# Patient Record
Sex: FEMALE | Race: BLACK OR AFRICAN AMERICAN
[De-identification: names, ages, dates, MRNs, and addresses within clinical notes are randomized per-mention and may not be internally consistent; named-entity substitution may affect disease eponyms.]

---

## 2017-03-01 NOTE — EMERGENCY ROOM REPORT
History of Present Illness


General


Chief Complaint:  Pain


Source:  Patient





Present Illness


HPI


51-year-old female presents to emergency Department complaining of painful 

lower abdominal cramping with heavy vaginal bleeding since yesterday.  Patient 

states that she has not had her period for several months and she is currently 

starting menopause.  Patient states that her symptoms are not relieved by 

Motrin.  Patient denies dizziness, nausea, vomiting, chills or weakness. Denies 

abdominal tenderness. Patient denies history of blood dyscrasia she is not 

taking any blood thinning medications.  She denies vaginal discharge other than 

blood or history of STDs.  She denies frequency, urgency, hematuria, dysuria. 

Pt estimates using 2 super absorbency pads today and one yesterday. Denies CP, 

Palpitations, LOC, AMS, dizziness, Changes in Vision, Sensation, paresthesias, 

or a sudden severe headache.


Allergies:  


Coded Allergies:  


     PENICILLINS (Verified  Allergy, Mild, Rash, 8/16/14)





Patient History


Past Medical History:  see triage record


Past Surgical History:  none


Pertinent Family History:  none


Last Menstrual Period:  on period


Immunizations:  UTD


Reviewed Nursing Documentation:  PMH: Agreed, PSxH: Agreed





Nursing Documentation-PMH


Past Medical History:  No History, Except For


Hx Hypertension:  Yes


Hx Cerebrovascular Accident:  Yes - 2014 x2


Hx Seizures:  No





Review of Systems


All Other Systems:  negative except mentioned in HPI





Physical Exam





Vital Signs








  Date Time  Temp Pulse Resp B/P Pulse Ox O2 Delivery O2 Flow Rate FiO2


 


3/1/17 13:30 98.1 87 16 138/72 99   








Sp02 EP Interpretation:  reviewed, normal


General Appearance:  no apparent distress, alert, GCS 15, non-toxic


Head:  normocephalic, atraumatic


Eyes:  bilateral eye PERRL, bilateral eye normal inspection


ENT:  hearing grossly normal, normal pharynx, no angioedema, normal voice


Neck:  full range of motion, supple/symm/no masses


Respiratory:  lungs clear, normal breath sounds, speaking full sentences


Cardiovascular #1:  regular rate, rhythm, no edema


Gastrointestinal:  normal bowel sounds, non tender, soft, no guarding, no 

rebound, other - Negative Eden signs, Negative MacBurney's sign, Negative 

Rosvigns Sign, Negative Psoas, No Peritoneal signs.


Rectal:  deferred


Genitourinary:  normal inspection, no CVA tenderness, adnexa normal, other - no 

adenexal pain on palpation


Musculoskeletal:  back normal, gait/station normal, normal range of motion, non-

tender, no calf tenderness


Neurologic:  alert, oriented x3, responsive, motor strength/tone normal, 

sensory intact, speech normal


Psychiatric:  judgement/insight normal, memory normal, mood/affect normal, no 

suicidal/homicidal ideation


Skin:  normal color, no rash, warm/dry, well hydrated


Lymphatic:  no adenopathy





Medical Decision Making


PA Attestation


Dr. roldan is my supervising Physician whom patient management has been 

discussed with.


Diagnostic Impression:  


 Primary Impression:  


 Menometrorrhagia


 Additional Impression:  


 Menopausal bleeding


ER Course


51-year-old female presents to emergency Department complaining of painful 

lower abdominal cramping with heavy vaginal bleeding since yesterday.  Patient 

states that she has not had her period for several months and she is currently 

starting menopause.  Patient states that her symptoms are not relieved by 

Motrin.  Patient denies dizziness, nausea, vomiting, chills or weakness.  

Patient denies history of blood dyscrasia she is not taking any blood thinning 

medications.  She denies vaginal discharge other than blood or history of STDs.

  She denies frequency, urgency, hematuria, dysuria.





-Estimates using 2 super absorbency pads today and one yesterday. 





Ddx considered but are not limited to: Fibroid, , Malignancy,  DUB





Vital signs: are WNL, pt. is afebrile 





Pelvic Exam: Pt Declines 


H&PE are most consistent with: Painful Menstraul cramping. no symptoms to 

suggest anemia. 





ORDERS: 


- US not required at this time. 





ED INTERVENTIONS: Norco PO








-Pt. is stable for close outpatient follow up and eval by OBGYN.


 


DISCHARGE: At this time pt. is stable for d/c to home. Will provide printed 

patient care instructions, and any necessary prescriptions. Care plan and 

follow up instructions have been discussed with the patient prior to discharge.





Last Vital Signs








  Date Time  Temp Pulse Resp B/P Pulse Ox O2 Delivery O2 Flow Rate FiO2


 


3/1/17 13:30 98.1 87 16 138/72 99   








Disposition:  HOME, SELF-CARE


Condition:  Stable


Scripts


Ibuprofen* (MOTRIN*) 800 Mg Tablet


800 MG ORAL THREE TIMES A DAY, #30 TAB 0 Refills


   Prov: Ana Adan.MARY ANN         3/1/17 


Hydrocodone Bit/Acetaminophen 5-325* (NORCO 5-325 TABLET*) 1 Each Tablet


1 TAB ORAL Q8HR Y for For Pain, #5 TAB


   Prov: Ana Adan         3/1/17


Referrals:  


NON PHYSICIAN (PCP)


Patient Instructions:  Menopause, Menorrhagia, Easy-to-Read





Additional Instructions:  


Take medications as directed. 


Follow up with PCP in 3-5 days 


Return sooner to ED if new symptoms occur, or current symptoms become worse. 


Do not drink alcohol, drive, or operate heavy machinery while taking Norco as 

this may cause drowsiness. 











- Please note that this Emergency Department Report was dictated using [employer] technology software, occasionally this can lead to 

erroneous entry secondary to interpretation by the dictation equipment.











Ana Adan Mar 1, 2017 14:22

## 2018-01-13 NOTE — EMERGENCY ROOM REPORT
History of Present Illness


General


Chief Complaint:  Upper Respiratory Illness


Source:  Patient, Medical Record





Present Illness


HPI


52-year-old female presents to the emergency department complaining of dry 

nonproductive cough times one week.  Patient reports initial symptoms were 

runny nose, nasal congestion and increased phlegm.  Patient reports that cough 

continues to linger.  Patient denies history of asthma or COPD.  And denies 

fevers or chills. Denies high fevers, lethargy, neck stiffness, irritability, 

dehydration, N/V/D. Denies Cp, Palpitations, LOC, AMS, seizures, paresthesias, 

or changes in Hearing or vision, no Sudden severe HA.


Allergies:  


Coded Allergies:  


     PENICILLINS (Verified  Allergy, Mild, Rash, 8/16/14)





Patient History


Past Medical History:  see triage record


Past Surgical History:  none


Pertinent Family History:  none


Immunizations:  UTD - no flu vaccination this year


Reviewed Nursing Documentation:  PMH: Agreed, PSxH: Agreed





Nursing Documentation-PMH


Past Medical History:  No History, Except For


Hx Hypertension:  Yes


Hx Cerebrovascular Accident:  Yes - 2014 x2


Hx Seizures:  No





Review of Systems


All Other Systems:  negative except mentioned in HPI





Physical Exam





Vital Signs








  Date Time  Temp Pulse Resp B/P (MAP) Pulse Ox O2 Delivery O2 Flow Rate FiO2


 


1/13/18 12:55 98.8 79 18 168/95 97 Room Air  








Sp02 EP Interpretation:  reviewed, normal


General Appearance:  no apparent distress, alert, GCS 15, non-toxic


Head:  normocephalic, atraumatic


Eyes:  bilateral eye normal inspection, bilateral eye PERRL


ENT:  hearing grossly normal, normal voice, TMs + canals normal, uvula midline, 

moist mucus membranes, nasal congestion


Neck:  full range of motion


Respiratory:  chest non-tender, lungs clear, speaking full sentences, wheezing 

- scant upper lobe wheezes bilaterally.


Cardiovascular #1:  regular rate, rhythm


Rectal:  deferred


Genitourinary:  normal inspection


Musculoskeletal:  back normal, gait/station normal, normal range of motion, non-

tender


Neurologic:  alert, oriented x3, responsive, motor strength/tone normal, 

sensory intact, speech normal, grossly normal


Psychiatric:  judgement/insight normal


Skin:  normal color, no rash, warm/dry, well hydrated


Lymphatic:  no adenopathy





Medical Decision Making


PA Attestation


Dr. Dove  is my supervising Physician whom patient management has been 

discussed with.


Diagnostic Impression:  


 Primary Impression:  


 Bronchitis


ER Course


52-year-old female presents to the emergency department complaining of dry 

nonproductive cough times one week.  Patient reports initial symptoms were 

runny nose, nasal congestion and increased phlegm.  Patient reports that cough 

continues to linger.  Patient denies history of asthma or COPD.  And denies 

fevers or chills. Denies high fevers, lethargy, neck stiffness, irritability, 

dehydration, N/V/D. Denies Cp, Palpitations, LOC, AMS, seizures, paresthesias, 

or changes in Hearing or vision, no Sudden severe HA. 





Ddx considered but are not limited to URI, pneumonia, PE, strep pharyngitis, 

meningitis.





Vital signs: Pt.is afebrile VS are WNL


H&PE are most consistent with bronchitis





ORDERS: none required at this time, the diagnosis is clinical





ED INTERVENTIONS: None required at this time. 





DISCHARGE: At this time pt. is stable for d/c to home. Will provide printed 

patient care instructions, and any necessary prescriptions. Care plan and 

follow up instructions have been discussed with the patient prior to discharge.





Last Vital Signs








  Date Time  Temp Pulse Resp B/P (MAP) Pulse Ox O2 Delivery O2 Flow Rate FiO2


 


1/13/18 12:55 98.8 79 18 168/95 97 Room Air  








Disposition:  HOME, SELF-CARE


Condition:  Stable


Scripts


Guaifenesin (Guaifenesin) 1,200 Mg Tab.er.12h


1200 MG PO BID, #20 TAB


   Prov: Ana Adan P.A.         1/13/18 


Albuterol Sulfate* (ALBUTEROL SULFATE MDI*) 8.5 Gm Hfa.aer.ad


2 PUFF INH Q3H, #1 INH 0 Refills


   Prov: Ana Adan P.A.         1/13/18 


Benzonatate* (TESSALON PERLE*) 100 Mg Capsule


100 MG ORAL THREE TIMES A DAY, #30 PERLE


   Prov: Ana Adan P.A.         1/13/18 


Codeine/Promethazine Hcl* (PROMETHAZINE-CODEINE SYRUP*) 118 Ml Syrup


5 ML ORAL Q6H Y for For Cough, #118 ML 0 Refills


   Prov: Ana Adan P.A.         1/13/18


Patient Instructions:  Acute Bronchitis, Easy-to-Read





Additional Instructions:  


Take medications as directed. 


 ** Follow up with a Primary Care Provider in 3-5 days, even if your symptoms 

have resolved. ** 


--Please review list of primary care clinics, if you do not already have a 

primary care provider





Return sooner to ED if new symptoms occur, or current symptoms become worse. 


Do not drink alcohol, drive, or operate heavy machinery while taking Cough 

Syrup as this may cause drowsiness. 











- Please note that this Emergency Department Report was dictated using [employer] technology software, occasionally this can lead to 

erroneous entry secondary to interpretation by the dictation equipment.











Ana Adan Jan 13, 2018 13:22

## 2018-02-07 NOTE — EMERGENCY ROOM REPORT
History of Present Illness


General


Chief Complaint:  Earache


Source:  Patient





Present Illness


HPI


The patient was treated for influenza and upper respiratory infection.  Chest 

congestion and ear pain at this time has pressure in her ears.  Feels like 

fluid in her ears.  Also sinus congestion - clear.  Minimal sore throat - 

tolerating eating without difficulty.   Cough has improved.





No NVD, dysuria, rashes, headache.





Feels wiped out from several weeks of illness.


Allergies:  


Coded Allergies:  


     PENICILLINS (Verified  Allergy, Mild, Rash, 8/16/14)





Patient History


Past Medical History:  see triage record


Social History:  Denies: smoking


Reviewed Nursing Documentation:  PMH: Agreed, PSxH: Agreed





Nursing Documentation-PMH


Hx Hypertension:  Yes


Hx Cerebrovascular Accident:  Yes - 2014 x2


Hx Seizures:  No





Review of Systems


All Other Systems:  negative except mentioned in HPI





Physical Exam





Vital Signs








  Date Time  Temp Pulse Resp B/P (MAP) Pulse Ox O2 Delivery O2 Flow Rate FiO2


 


2/7/18 09:49 97.5 86 20 174/97 99 Room Air  








Sp02 EP Interpretation:  reviewed, normal


General Appearance:  well appearing, no apparent distress, GCS 15


Head:  normocephalic, atraumatic


Eyes:  bilateral eye normal inspection, bilateral eye PERRL


ENT:  other - bilat TMs with buldge and fluid, min erythema - clear d/c nose


Neck:  full range of motion, supple


Respiratory:  chest non-tender, lungs clear, normal breath sounds, no 

respiratory distress, speaking full sentences


Cardiovascular #1:  normal peripheral pulses, no edema


Cardiovascular #2:  2+ radial (R)


Gastrointestinal:  normal inspection


Musculoskeletal:  digits/nails normal, gait/station normal


Neurologic:  alert, normal gait, grossly normal


Psychiatric:  mood/affect normal


Skin:  no rash





Medical Decision Making


Diagnostic Impression:  


 Primary Impression:  


 Bilateral otitis media


 Qualified Codes:  H66.003 - Acute suppurative otitis media without spontaneous 

rupture of ear drum, bilateral


ER Course


Patient post influenza with ear pain.  Ddx: otitis media, serous OM.  Exam 

against OE.  Antibiotics indicated.





Patient treated here - improved.





Patient stable for outpatient observation and treatment.





Last Vital Signs








  Date Time  Temp Pulse Resp B/P (MAP) Pulse Ox O2 Delivery O2 Flow Rate FiO2


 


2/7/18 10:47 97.5 89 20 165/90 99 Room Air  








Status:  improved


Disposition:  HOME, SELF-CARE


Condition:  Improved


Scripts


Guaifenesin/Codeine Phos* (ROBITUSSIN AC*) 118 Ml Liquid


5 ML ORAL Q6H Y for For Cough, #90 ML 0 Refills


   Prov: Jason Miles M.D.         2/7/18 


Ciprofloxacin* (CIPRO*) 500 Mg Tablet


500 MG PO BID, #14 TAB


   Prov: Jason Miles M.D.         2/7/18 


Chlorpheniramine Maleate (CHLOR-TRIMETON) 4 Mg Tablet


4 MG PO Q6HR Y for congestion, #14 TAB


   Prov: Jason Miles M.D.         2/7/18











Jason Miles M.D. Feb 7, 2018 10:28

## 2018-08-18 ENCOUNTER — HOSPITAL ENCOUNTER (EMERGENCY)
Dept: HOSPITAL 72 - EMR | Age: 53
Discharge: HOME | End: 2018-08-18
Payer: MEDICAID

## 2018-08-18 VITALS — WEIGHT: 160 LBS | BODY MASS INDEX: 27.31 KG/M2 | HEIGHT: 64 IN

## 2018-08-18 VITALS — SYSTOLIC BLOOD PRESSURE: 128 MMHG | DIASTOLIC BLOOD PRESSURE: 76 MMHG

## 2018-08-18 VITALS — SYSTOLIC BLOOD PRESSURE: 111 MMHG | DIASTOLIC BLOOD PRESSURE: 70 MMHG

## 2018-08-18 DIAGNOSIS — Z88.0: ICD-10-CM

## 2018-08-18 DIAGNOSIS — I10: ICD-10-CM

## 2018-08-18 DIAGNOSIS — H15.103: Primary | ICD-10-CM

## 2018-08-18 DIAGNOSIS — Z86.73: ICD-10-CM

## 2018-08-18 PROCEDURE — 99283 EMERGENCY DEPT VISIT LOW MDM: CPT

## 2018-08-18 NOTE — EMERGENCY ROOM REPORT
History of Present Illness


General


Chief Complaint:  Eye Problems


Source:  Patient, Medical Record





Present Illness


HPI


53 YO Female presents to the ED c/o bilateral blood shot eyes x 2 weeks. pt. 

became worried this am when she awoke reports that her eyes were completely red 

and more so than before. Denies Pain, Discharge,  changes or  Loss of vision, 

Floaters, Flashing lights, Diplopia/blurry vision, or Increased tearing. Pt. 

denies FB sensation. Pt. reports hx of lupus. denies recent URI. Denies 

allergies. Denies contact lens use.


Allergies:  


Coded Allergies:  


     PENICILLINS (Verified  Allergy, Mild, Rash, 8/16/14)





Patient History


Past Medical History:  see triage record


Past Surgical History:  none


Pertinent Family History:  none


Pregnant Now:  No


Immunizations:  UTD


Reviewed Nursing Documentation:  PMH: Agreed; PSxH: Agreed





Nursing Documentation-PMH


Past Medical History:  No History, Except For


Hx Hypertension:  Yes


Hx Cerebrovascular Accident:  Yes - 2014 x2


Hx Seizures:  No





Review of Systems


All Other Systems:  negative except mentioned in HPI





Physical Exam





Vital Signs








  Date Time  Temp Pulse Resp B/P (MAP) Pulse Ox O2 Delivery O2 Flow Rate FiO2


 


8/18/18 17:50 97.9 75 16 128/76 97 Room Air  





 97.9       








Sp02 EP Interpretation:  reviewed, normal


General Appearance:  well appearing, no apparent distress, alert, GCS 15, non-

toxic


Head:  normocephalic, atraumatic


Eyes:  bilateral eye normal inspection, bilateral eye PERRL, bilateral eye EOMI

, bilateral eye Scleral Injection, bilateral eye other - scleral injection 

peripherally that is painless, no other ocular symptoms. no evidence of fb on 

lid flip, no crusting, d/c, or abnormal pupillary response. no photophobia. no 

mid-fixed dilated pupil.


ENT:  hearing grossly normal, normal voice


Neck:  full range of motion


Respiratory:  lungs clear, normal breath sounds, speaking full sentences


Cardiovascular #1:  regular rate, rhythm


Musculoskeletal:  back normal, gait/station normal, normal range of motion, non-

tender


Neurologic:  alert, oriented x3, responsive, motor strength/tone normal, 

sensory intact, normal gait, speech normal, grossly normal


Psychiatric:  judgement/insight normal


Skin:  normal color, no rash, warm/dry, well hydrated


Lymphatic:  no adenopathy





Medical Decision Making


PA Attestation


Dr. roldan is my supervising Physician whom patient management has been 

discussed with.


Diagnostic Impression:  


 Primary Impression:  


 Episcleritis of both eyes


ER Course


53 YO Female presents to the ED c/o bilateral blood shot eyes x 2 weeks. pt. 

became worried this am when she awoke reports that her eyes were completely red 

and more so than before. Denies Pain, Discharge,  changes or  Loss of vision, 

Floaters, Flashing lights, Diplopia/blurry vision, or Increased tearing. Pt. 

denies FB sensation. Pt. reports hx of lupus. denies recent URI. Denies 

allergies. Denies contact lens use. 





- Pt [reports/denies] Contact lens use.


Ddx considered but are not limited to: corneal abrasion, acute glaucoma, globe 

rupture, FB, Corneal Ulcer, conjunctivitis. Iridis 





Vital signs: are WNL, pt. is afebrile 





H&PE: Are most consistent with: episcleritis, scleral injection peripherally 

that is painless, no other ocular symptoms. no evidence of fb on lid flip, no 

crusting, d/c, or abnormal pupillary response. no photophobia. no mid-fixed 

dilated pupil. 


- Pt. NAD, non-toxic in appearance.





ORDERS: 


-none required at this time. 





ED INTERVENTIONS: none at this time. 





DISCHARGE: At this time pt. is stable for d/c to home. Will provide printed 

patient care instructions, and any necessary prescriptions. Care plan and 

follow up instructions have been discussed with the patient prior to discharge. 

.





Last Vital Signs








  Date Time  Temp Pulse Resp B/P (MAP) Pulse Ox O2 Delivery O2 Flow Rate FiO2


 


8/18/18 18:00 97.9 75 16 128/76 97 Room Air  





 97.9       








Disposition:  HOME, SELF-CARE


Condition:  Stable


Scripts


Diclofenac Sodium (Diclofenac Sodium) 5 Ml Drops


1 DROP OP BID, #5 ML


   Prov: Ana Adan         8/18/18 


Dextran 70/Hypromellose (ARTIFICIAL TEARS EYE DROPS*) 15 Ml Drops


1 DROP BOTH EYES QID, #15 ML 0 Refills


   Prov: Ana Adan         8/18/18


Referrals:  


NON PHYSICIAN (PCP)


Patient Instructions:  Scleritis and Episcleritis





Additional Instructions:  


Take medications as directed. 





 ** Follow up with a Ophthalmologist in 3 days, even if your symptoms have 

resolved. ** 





--Please review list of primary care clinics, if you do not already have a 

primary care provider





Return sooner to ED if new symptoms occur, or current symptoms become worse. 








- Please note that this Emergency Department Report was dictated using [employer] technology software, occasionally this can lead to 

erroneous entry secondary to interpretation by the dictation equipment.











Ana Adan Aug 18, 2018 18:26

## 2018-08-22 ENCOUNTER — HOSPITAL ENCOUNTER (EMERGENCY)
Dept: HOSPITAL 72 - EMR | Age: 53
Discharge: HOME | End: 2018-08-22
Payer: MEDICAID

## 2018-08-22 VITALS — WEIGHT: 150 LBS | HEIGHT: 59 IN | BODY MASS INDEX: 30.24 KG/M2

## 2018-08-22 VITALS — DIASTOLIC BLOOD PRESSURE: 80 MMHG | SYSTOLIC BLOOD PRESSURE: 145 MMHG

## 2018-08-22 VITALS — SYSTOLIC BLOOD PRESSURE: 145 MMHG | DIASTOLIC BLOOD PRESSURE: 75 MMHG

## 2018-08-22 DIAGNOSIS — N39.0: ICD-10-CM

## 2018-08-22 DIAGNOSIS — Z88.0: ICD-10-CM

## 2018-08-22 DIAGNOSIS — Z86.73: ICD-10-CM

## 2018-08-22 DIAGNOSIS — I10: ICD-10-CM

## 2018-08-22 DIAGNOSIS — R55: Primary | ICD-10-CM

## 2018-08-22 DIAGNOSIS — M32.9: ICD-10-CM

## 2018-08-22 DIAGNOSIS — H10.10: ICD-10-CM

## 2018-08-22 LAB
ADD MANUAL DIFF: NO
ALBUMIN SERPL-MCNC: 4.2 G/DL (ref 3.4–5)
ALBUMIN/GLOB SERPL: 1 {RATIO} (ref 1–2.7)
ALP SERPL-CCNC: 59 U/L (ref 46–116)
ALT SERPL-CCNC: 15 U/L (ref 12–78)
ANION GAP SERPL CALC-SCNC: 9 MMOL/L (ref 5–15)
APPEARANCE UR: (no result)
APTT PPP: (no result) S
AST SERPL-CCNC: 15 U/L (ref 15–37)
BASOPHILS NFR BLD AUTO: 1 % (ref 0–2)
BILIRUB SERPL-MCNC: 0.3 MG/DL (ref 0.2–1)
BUN SERPL-MCNC: 14 MG/DL (ref 7–18)
CALCIUM SERPL-MCNC: 9.9 MG/DL (ref 8.5–10.1)
CHLORIDE SERPL-SCNC: 105 MMOL/L (ref 98–107)
CK MB SERPL-MCNC: 0.9 NG/ML (ref 0–3.6)
CK SERPL-CCNC: 79 U/L (ref 26–308)
CO2 SERPL-SCNC: 28 MMOL/L (ref 21–32)
CREAT SERPL-MCNC: 0.9 MG/DL (ref 0.55–1.3)
EOSINOPHIL NFR BLD AUTO: 1.1 % (ref 0–3)
ERYTHROCYTE [DISTWIDTH] IN BLOOD BY AUTOMATED COUNT: 13.5 % (ref 11.6–14.8)
GLOBULIN SER-MCNC: 4.2 G/DL
GLUCOSE UR STRIP-MCNC: NEGATIVE MG/DL
HCT VFR BLD CALC: 39.4 % (ref 37–47)
HGB BLD-MCNC: 13.1 G/DL (ref 12–16)
KETONES UR QL STRIP: NEGATIVE
LEUKOCYTE ESTERASE UR QL STRIP: (no result)
LYMPHOCYTES NFR BLD AUTO: 41.1 % (ref 20–45)
MCV RBC AUTO: 81 FL (ref 80–99)
MONOCYTES NFR BLD AUTO: 6 % (ref 1–10)
NEUTROPHILS NFR BLD AUTO: 50.8 % (ref 45–75)
NITRITE UR QL STRIP: NEGATIVE
PH UR STRIP: 6 [PH] (ref 4.5–8)
PLATELET # BLD: 256 K/UL (ref 150–450)
POTASSIUM SERPL-SCNC: 3.3 MMOL/L (ref 3.5–5.1)
PROT UR QL STRIP: (no result)
RBC # BLD AUTO: 4.85 M/UL (ref 4.2–5.4)
SODIUM SERPL-SCNC: 142 MMOL/L (ref 136–145)
SP GR UR STRIP: 1.02 (ref 1–1.03)
UROBILINOGEN UR-MCNC: NORMAL MG/DL (ref 0–1)
WBC # BLD AUTO: 8.4 K/UL (ref 4.8–10.8)

## 2018-08-22 PROCEDURE — 99284 EMERGENCY DEPT VISIT MOD MDM: CPT

## 2018-08-22 PROCEDURE — 85025 COMPLETE CBC W/AUTO DIFF WBC: CPT

## 2018-08-22 PROCEDURE — 80053 COMPREHEN METABOLIC PANEL: CPT

## 2018-08-22 PROCEDURE — 71045 X-RAY EXAM CHEST 1 VIEW: CPT

## 2018-08-22 PROCEDURE — 82550 ASSAY OF CK (CPK): CPT

## 2018-08-22 PROCEDURE — 82553 CREATINE MB FRACTION: CPT

## 2018-08-22 PROCEDURE — 93005 ELECTROCARDIOGRAM TRACING: CPT

## 2018-08-22 PROCEDURE — 36415 COLL VENOUS BLD VENIPUNCTURE: CPT

## 2018-08-22 PROCEDURE — 84484 ASSAY OF TROPONIN QUANT: CPT

## 2018-08-22 PROCEDURE — 81003 URINALYSIS AUTO W/O SCOPE: CPT

## 2018-08-22 NOTE — EMERGENCY ROOM REPORT
History of Present Illness


General


Chief Complaint:  Syncope


Source:  Patient, Medical Record





Present Illness


HPI


This patient states that yesterday in an ophthalmology office she had an 

episode of fainting.  She states that she was there to get evaluated for red 

conjunctiva.  She states that she became lightheaded and then had an episode of 

fainting.  She states that she was taken to [facility] but left 

AGAINST MEDICAL ADVICE.  She states she was told by the emergency medicine 

physician there that she had an elevated potassium.  She states that she is 

feeling better today.  She states she relates the elevated potassium to her 

amlodipine.  She has decided to go off her amlodipine.  She is going to take 

her losartan although she did not take it today.  She denies chest pain or 

shortness of breath.  She denies abdominal pain.  She denies fever or chills.  

She states that she did have multiple episodes of vomiting yesterday and also 

had diarrhea. She has had no vomiting today.  She denies abdominal pain.  She 

has no other complaints.


Allergies:  


Coded Allergies:  


     PENICILLINS (Verified  Allergy, Mild, Rash, 8/16/14)





Patient History


Past Medical History:  HTN, other - SLE


Social History:  Denies: smoking, alcohol use, drug use


Last Menstrual Period:  long time ago


Reviewed Nursing Documentation:  PMH: Agreed; PSxH: Agreed





Nursing Documentation-PMH


Past Medical History:  No History, Except For


Hx Hypertension:  Yes


Hx Cerebrovascular Accident:  Yes - 2014 x2


Hx Seizures:  No





Review of Systems


All Other Systems:  negative except mentioned in HPI





Physical Exam





Vital Signs








  Date Time  Temp Pulse Resp B/P (MAP) Pulse Ox O2 Delivery O2 Flow Rate FiO2


 


8/22/18 18:22 100.1 88 18 136/75 97 Room Air  





 100.0       








Sp02 EP Interpretation:  reviewed, normal


General Appearance:  no apparent distress, alert, GCS 15, non-toxic


Head:  normocephalic, atraumatic


Eyes:  bilateral eye normal inspection, bilateral eye PERRL


ENT:  hearing grossly normal, normal pharynx, no angioedema, normal voice


Neck:  full range of motion, supple/symm/no masses


Respiratory:  chest non-tender, lungs clear, normal breath sounds, no 

respiratory distress, no retraction, no accessory muscle use, speaking full 

sentences


Cardiovascular #1:  regular rate, rhythm, no edema


Gastrointestinal:  normal bowel sounds, non tender, soft, non-distended, no 

guarding, no rebound


Rectal:  deferred


Musculoskeletal:  back normal, gait/station normal, normal range of motion, non-

tender


Neurologic:  alert, oriented x3, responsive, motor strength/tone normal, 

sensory intact, speech normal


Psychiatric:  judgement/insight normal, memory normal, mood/affect normal, no 

suicidal/homicidal ideation


Skin:  normal color, no rash, warm/dry, well hydrated





Medical Decision Making


Diagnostic Impression:  


 Primary Impression:  


 Syncope


 Additional Impressions:  


 Allergic conjunctivitis


 UTI (urinary tract infection)


ER Course


I suspect the syncope that the patient is presenting with is a nonemergent in 

etiology.  Regarding the history, the patient has no history of structural 

heart disease or coronary artery disease, no family history of sudden death, 

has no shortness of breath, and the syncope is not exertional.  On physical exam

, the patient is not hypotensive, has no findings of CHF, and no significant 

cardiac murmur suggestive of valvular heart disease or cardiac outflow 

obstruction.  The patient reports no history of seizure or head trauma.  EKG 

showed no evidence of concerning findings of QT prolongation, Brugada syndrome 

or significant ST changes suggestive of acute ischemia, dysrhythmias or 

significant conduction abnormalities.  On laboratory evaluation, blood sugar 

was normal and the patient is not anemic.  The patient was counseled that, 

though unlikely, the possibility of an emergent cause of syncope may be present 

and that the patient should return immediately if symptoms persist or worsen.  

I believe the patient is stable for discharge to followup with her PMD for 

further workup.





Laboratory Tests








Test


  8/22/18


18:40 8/22/18


18:55


 


Urine Color Pale yellow   


 


Urine Appearance


  Slightly


cloudy 


 


 


Urine pH 6 (4.5-8.0)   


 


Urine Specific Gravity


  1.020


(1.005-1.035) 


 


 


Urine Protein


  2+ (NEGATIVE)


H 


 


 


Urine Glucose (UA)


  Negative


(NEGATIVE) 


 


 


Urine Ketones


  Negative


(NEGATIVE) 


 


 


Urine Blood


  2+ (NEGATIVE)


H 


 


 


Urine Nitrite


  Negative


(NEGATIVE) 


 


 


Urine Bilirubin


  Negative


(NEGATIVE) 


 


 


Urine Urobilinogen


  Normal MG/DL


(0.0-1.0) 


 


 


Urine Leukocyte Esterase


  3+ (NEGATIVE)


H 


 


 


Urine RBC


  2-4 /HPF (0 -


2)  H 


 


 


Urine WBC


  5-10 /HPF (0 -


2)  H 


 


 


Urine Squamous Epithelial


Cells Moderate /LPF


(NONE/OCC)  H 


 


 


Urine Bacteria


  Few /HPF


(NONE) 


 


 


White Blood Count


  


  8.4 K/UL


(4.8-10.8)


 


Red Blood Count


  


  4.85 M/UL


(4.20-5.40)


 


Hemoglobin


  


  13.1 G/DL


(12.0-16.0)


 


Hematocrit


  


  39.4 %


(37.0-47.0)


 


Mean Corpuscular Volume  81 FL (80-99)  


 


Mean Corpuscular Hemoglobin


  


  26.9 PG


(27.0-31.0)  L


 


Mean Corpuscular Hemoglobin


Concent 


  33.1 G/DL


(32.0-36.0)


 


Red Cell Distribution Width


  


  13.5 %


(11.6-14.8)


 


Platelet Count


  


  256 K/UL


(150-450)


 


Mean Platelet Volume


  


  6.3 FL


(6.5-10.1)  L


 


Neutrophils (%) (Auto)


  


  50.8 %


(45.0-75.0)


 


Lymphocytes (%) (Auto)


  


  41.1 %


(20.0-45.0)


 


Monocytes (%) (Auto)


  


  6.0 %


(1.0-10.0)


 


Eosinophils (%) (Auto)


  


  1.1 %


(0.0-3.0)


 


Basophils (%) (Auto)


  


  1.0 %


(0.0-2.0)


 


Sodium Level


  


  142 MMOL/L


(136-145)


 


Potassium Level


  


  3.3 MMOL/L


(3.5-5.1)  L


 


Chloride Level


  


  105 MMOL/L


()


 


Carbon Dioxide Level


  


  28 MMOL/L


(21-32)


 


Anion Gap


  


  9 mmol/L


(5-15)


 


Blood Urea Nitrogen


  


  14 mg/dL


(7-18)


 


Creatinine


  


  0.9 MG/DL


(0.55-1.30)


 


Estimate Glomerular


Filtration Rate 


  > 60 mL/min


(>60)


 


Glucose Level


  


  90 MG/DL


()


 


Calcium Level


  


  9.9 MG/DL


(8.5-10.1)


 


Total Bilirubin


  


  0.3 MG/DL


(0.2-1.0)


 


Aspartate Amino Transferase


(AST) 


  15 U/L (15-37)


 


 


Alanine Aminotransferase (ALT)


  


  15 U/L (12-78)


 


 


Alkaline Phosphatase


  


  59 U/L


()


 


Total Creatine Kinase


  


  79 U/L


()


 


Creatine Kinase MB


  


  0.9 NG/ML


(0.0-3.6)


 


Creatine Kinase MB Relative


Index 


  1.1  


 


 


Troponin I


  


  0.000 ng/mL


(0.000-0.056)


 


Total Protein


  


  8.4 G/DL


(6.4-8.2)  H


 


Albumin


  


  4.2 G/DL


(3.4-5.0)


 


Globulin  4.2 g/dL  


 


Albumin/Globulin Ratio  1.0 (1.0-2.7)  








EKG Diagnostic Results


Rate:  normal


Rhythm:  NSR


ST Segments:  no acute changes





Rhythm Strip Diag. Results


EP Interpretation:  yes


Rate:  80's


Rhythm:  NSR, no PVC's, no ectopy





Chest X-Ray Diagnostic Results


Chest X-Ray Diagnostic Results :  


   Chest X-Ray Ordered:  Yes


   # of Views/Limited/Complete:  1 View


   Indication:  Other


   Interpretation:  no consolidation, no effusion, no pneumothorax, no acute 

cardiopulmonary disease


   Impression:  No acute disease


   Electronically Signed by:  Canelo





Last Vital Signs








  Date Time  Temp Pulse Resp B/P (MAP) Pulse Ox O2 Delivery O2 Flow Rate FiO2


 


8/22/18 19:00  71 18 145/75 97 Room Air  


 


8/22/18 18:22 100.1       





 100.0       








Status:  improved


Disposition:  HOME, SELF-CARE


Condition:  Stable


Scripts


Nitrofurantoin Monohyd/M-Cryst* (MACROBID 100 MG*) 100 Mg Capsule


100 MG ORAL EVERY 12 HOURS for 7 Days, #14 CAP


   Prov: Melany Hardy DO         8/22/18


Referrals:  


[facility],REFERRING (PCP)











Melany Hardy DO Aug 22, 2018 20:16

## 2018-08-23 NOTE — DIAGNOSTIC IMAGING REPORT
Indication: Syncope

 

Technique: XRAY Chest 1v

 

Comparison: 11/23/2014

 

Findings: Heart size and mediastinal contours are stable compared to the prior exam.

There is no definite focal airspace consolidation, pleural effusion or pneumothorax.

No acute osseous abnormality identified.

 

Impression: No radiographic evidence of acute cardiopulmonary disease.

## 2018-08-24 NOTE — CARDIOLOGY REPORT
--------------- APPROVED REPORT --------------





EKG Measurement

Heart [id number]

CA [id number]

[id number]

[id number]

[id number]





Normal sinus rhythm

Cannot rule out Anterior infarct, age undetermined

Abnormal ECG

## 2018-09-20 ENCOUNTER — HOSPITAL ENCOUNTER (EMERGENCY)
Dept: HOSPITAL 72 - EMR | Age: 53
Discharge: HOME | End: 2018-09-20
Payer: MEDICAID

## 2018-09-20 VITALS — HEIGHT: 59 IN | BODY MASS INDEX: 30.24 KG/M2 | WEIGHT: 150 LBS

## 2018-09-20 VITALS — SYSTOLIC BLOOD PRESSURE: 161 MMHG | DIASTOLIC BLOOD PRESSURE: 89 MMHG

## 2018-09-20 VITALS — DIASTOLIC BLOOD PRESSURE: 89 MMHG | SYSTOLIC BLOOD PRESSURE: 161 MMHG

## 2018-09-20 DIAGNOSIS — J34.89: ICD-10-CM

## 2018-09-20 DIAGNOSIS — M32.9: ICD-10-CM

## 2018-09-20 DIAGNOSIS — Z88.0: ICD-10-CM

## 2018-09-20 DIAGNOSIS — R09.81: ICD-10-CM

## 2018-09-20 DIAGNOSIS — I10: ICD-10-CM

## 2018-09-20 DIAGNOSIS — Z86.73: ICD-10-CM

## 2018-09-20 DIAGNOSIS — J40: Primary | ICD-10-CM

## 2018-09-20 DIAGNOSIS — F17.200: ICD-10-CM

## 2018-09-20 PROCEDURE — 99283 EMERGENCY DEPT VISIT LOW MDM: CPT

## 2018-09-20 NOTE — EMERGENCY ROOM REPORT
History of Present Illness


General


Chief Complaint:  Upper Respiratory Illness


Source:  Patient





Present Illness


HPI


52-year-old female presents to the emergency department complaining of 

persistent cough 10 out of 10 in severity with bilateral ear pain, with nasal 

congestion and rhinorrhea.  Patient also reports new onset mucus that she is 

coughing up every now and then.  Patient reports chills she denies fevers.  

Patient states that her symptoms have been ongoing times almost 2 weeks.  

Patient reports history of weakness or consistent due to having lupus.  Patient 

denies sore throat she denies neck pain or stiffness or photophobia.  Denies CP

, Palpitations, LOC, AMS, dizziness, Changes in Vision, Sensation, paresthesias

, or a sudden severe headache.


Allergies:  


Coded Allergies:  


     PENICILLINS (Verified  Allergy, Mild, Rash, 8/16/14)





Patient History


Past Medical History:  see triage record


Pertinent Family History:  none


Pregnant Now:  No


Immunizations:  UTD


Reviewed Nursing Documentation:  PMH: Agreed; PSxH: Agreed





Nursing Documentation-PMH


Past Medical History:  No History, Except For


Hx Hypertension:  Yes


Hx Cerebrovascular Accident:  Yes - 2014 x2


Hx Seizures:  No





Review of Systems


All Other Systems:  negative except mentioned in HPI





Physical Exam





Vital Signs








  Date Time  Temp Pulse Resp B/P (MAP) Pulse Ox O2 Delivery O2 Flow Rate FiO2


 


9/20/18 18:01 97.6 84 14 161/89 99 Room Air  





 97.5       








Sp02 EP Interpretation:  reviewed, normal


General Appearance:  no apparent distress, alert, GCS 15, non-toxic


Head:  normocephalic, atraumatic


Eyes:  bilateral eye normal inspection, bilateral eye PERRL


ENT:  hearing grossly normal, normal voice, TMs + canals normal, uvula midline, 

moist mucus membranes, nasal congestion


Neck:  full range of motion, no meningismus, no bony tend


Respiratory:  chest non-tender, lungs clear, normal breath sounds, speaking 

full sentences


Cardiovascular #1:  regular rate, rhythm, no edema


Musculoskeletal:  back normal, gait/station normal, normal range of motion, non-

tender


Neurologic:  alert, oriented x3, responsive, motor strength/tone normal, 

sensory intact, speech normal, grossly normal


Psychiatric:  judgement/insight normal


Skin:  normal color, no rash, warm/dry, well hydrated


Lymphatic:  no adenopathy





Medical Decision Making


PA Attestation


 Dr. Kong is my supervising Physician whom patient management has been 

discussed with.


Diagnostic Impression:  


 Primary Impression:  


 Bronchitis


 Additional Impressions:  


 Post-nasal drainage


 Nasal congestion with rhinorrhea


ER Course


52-year-old female presents to the emergency department complaining of 

persistent cough 10 out of 10 in severity with bilateral ear pain, with nasal 

congestion and rhinorrhea.  Patient also reports new onset mucus that she is 

coughing up every now and then.  Patient reports chills she denies fevers.  

Patient states that her symptoms have been ongoing times almost 2 weeks.  

Patient reports history of weakness or consistent due to having lupus.  Patient 

denies sore throat she denies neck pain or stiffness or photophobia.  Denies CP

, Palpitations, LOC, AMS, dizziness, Changes in Vision, Sensation, paresthesias

, or a sudden severe headache. 





Ddx considered but are not limited to URI, pneumonia, PE, strep pharyngitis, 

meningitis, bronchitis just to name a few





Vital signs: Pt.is afebrile VS are WNL


H&PE are most consistent with bronchitis- patient is nontoxic in appearance I 

do not suspect bacterial pneumonia at this time





ORDERS: none required at this time, the diagnosis is clinical





ED INTERVENTIONS: None required at this time. 





DISCHARGE: At this time pt. is stable for d/c to home. Will provide printed 

patient care instructions, and any necessary prescriptions. Care plan and 

follow up instructions have been discussed with the patient prior to discharge.





Last Vital Signs








  Date Time  Temp Pulse Resp B/P (MAP) Pulse Ox O2 Delivery O2 Flow Rate FiO2


 


9/20/18 18:21 97.5  14 161/89 99 Room Air  





 97.5       


 


9/20/18 18:21  84      








Disposition:  HOME, SELF-CARE


Condition:  Stable


Scripts


Diphenhydramine Hcl (BENADRYL ALLERGY) 25 Mg Tablet


25 MG PO Q6HR, #20 TAB


   Prov: Ana Adan         9/20/18 


Pseudoephedrine Hcl* (NEXAFED*) 30 Mg Tablet


30 MG ORAL Q6H PRN for congestion, #15 TAB


   Prov: Ana Adan         9/20/18 


Albuterol Sulfate* (ALBUTEROL SULFATE MDI*) 8.5 Gm Hfa.aer.ad


2 PUFF INH Q6H, #1 INH 0 Refills


   Prov: Ana Adan         9/20/18 


Guaifenesin (Guaifenesin) 1,200 Mg Tab.er.12h


1200 MG PO BID, #20 TAB


   Prov: Ana Adan         9/20/18 


Codeine/Promethazine Hcl* (PROMETHAZINE-CODEINE SYRUP*) 118 Ml Syrup


5 ML ORAL Q6H PRN for For Cough, #120 ML 0 Refills


   Prov: Ana Adan         9/20/18


Patient Instructions:  Acute Bronchitis, Easy-to-Read





Additional Instructions:  


Take medications as directed. 


 ** Follow up with a Primary Care Provider in 3-5 days, even if your symptoms 

have resolved. ** 


--Please review list of primary care clinics, if you do not already have a 

primary care provider





Return sooner to ED if new symptoms occur, or current symptoms become worse. 


Do not drink alcohol, drive, or operate heavy machinery while taking Cough 

Syrup as this may cause drowsiness. 











- Please note that this Emergency Department Report was dictated using [employer] technology software, occasionally this can lead to 

erroneous entry secondary to interpretation by the dictation equipment.











Ana Adan Sep 20, 2018 18:44

## 2019-03-13 ENCOUNTER — HOSPITAL ENCOUNTER (EMERGENCY)
Dept: HOSPITAL 72 - EMR | Age: 54
Discharge: HOME | End: 2019-03-13
Payer: MEDICAID

## 2019-03-13 VITALS — SYSTOLIC BLOOD PRESSURE: 176 MMHG | DIASTOLIC BLOOD PRESSURE: 92 MMHG

## 2019-03-13 VITALS — SYSTOLIC BLOOD PRESSURE: 141 MMHG | DIASTOLIC BLOOD PRESSURE: 81 MMHG

## 2019-03-13 VITALS — WEIGHT: 154 LBS | HEIGHT: 59 IN | BODY MASS INDEX: 31.04 KG/M2

## 2019-03-13 VITALS — DIASTOLIC BLOOD PRESSURE: 81 MMHG | SYSTOLIC BLOOD PRESSURE: 141 MMHG

## 2019-03-13 DIAGNOSIS — I10: ICD-10-CM

## 2019-03-13 DIAGNOSIS — Z86.73: ICD-10-CM

## 2019-03-13 DIAGNOSIS — Z88.0: ICD-10-CM

## 2019-03-13 DIAGNOSIS — M54.5: Primary | ICD-10-CM

## 2019-03-13 LAB
ADD MANUAL DIFF: NO
ALBUMIN SERPL-MCNC: 4 G/DL (ref 3.4–5)
ALBUMIN/GLOB SERPL: 1 {RATIO} (ref 1–2.7)
ALP SERPL-CCNC: 65 U/L (ref 46–116)
ALT SERPL-CCNC: 16 U/L (ref 12–78)
ANION GAP SERPL CALC-SCNC: 9 MMOL/L (ref 5–15)
APPEARANCE UR: (no result)
APTT PPP: YELLOW S
AST SERPL-CCNC: 11 U/L (ref 15–37)
BASOPHILS NFR BLD AUTO: 0.8 % (ref 0–2)
BILIRUB SERPL-MCNC: 0.3 MG/DL (ref 0.2–1)
BUN SERPL-MCNC: 14 MG/DL (ref 7–18)
CALCIUM SERPL-MCNC: 9.6 MG/DL (ref 8.5–10.1)
CHLORIDE SERPL-SCNC: 104 MMOL/L (ref 98–107)
CO2 SERPL-SCNC: 28 MMOL/L (ref 21–32)
CREAT SERPL-MCNC: 1.1 MG/DL (ref 0.55–1.3)
EOSINOPHIL NFR BLD AUTO: 1.3 % (ref 0–3)
ERYTHROCYTE [DISTWIDTH] IN BLOOD BY AUTOMATED COUNT: 14.3 % (ref 11.6–14.8)
GLOBULIN SER-MCNC: 3.9 G/DL
GLUCOSE UR STRIP-MCNC: NEGATIVE MG/DL
HCT VFR BLD CALC: 40.1 % (ref 37–47)
HGB BLD-MCNC: 13.2 G/DL (ref 12–16)
KETONES UR QL STRIP: NEGATIVE
LEUKOCYTE ESTERASE UR QL STRIP: (no result)
LYMPHOCYTES NFR BLD AUTO: 51.4 % (ref 20–45)
MCV RBC AUTO: 80 FL (ref 80–99)
MONOCYTES NFR BLD AUTO: 5.7 % (ref 1–10)
NEUTROPHILS NFR BLD AUTO: 40.9 % (ref 45–75)
NITRITE UR QL STRIP: NEGATIVE
PH UR STRIP: 6.5 [PH] (ref 4.5–8)
PLATELET # BLD: 245 K/UL (ref 150–450)
POTASSIUM SERPL-SCNC: 3.6 MMOL/L (ref 3.5–5.1)
PROT UR QL STRIP: (no result)
RBC # BLD AUTO: 4.98 M/UL (ref 4.2–5.4)
SODIUM SERPL-SCNC: 141 MMOL/L (ref 136–145)
SP GR UR STRIP: 1.01 (ref 1–1.03)
UROBILINOGEN UR-MCNC: 1 MG/DL (ref 0–1)
WBC # BLD AUTO: 7.2 K/UL (ref 4.8–10.8)

## 2019-03-13 PROCEDURE — 80053 COMPREHEN METABOLIC PANEL: CPT

## 2019-03-13 PROCEDURE — 87086 URINE CULTURE/COLONY COUNT: CPT

## 2019-03-13 PROCEDURE — 36415 COLL VENOUS BLD VENIPUNCTURE: CPT

## 2019-03-13 PROCEDURE — 71045 X-RAY EXAM CHEST 1 VIEW: CPT

## 2019-03-13 PROCEDURE — 85025 COMPLETE CBC W/AUTO DIFF WBC: CPT

## 2019-03-13 PROCEDURE — 99283 EMERGENCY DEPT VISIT LOW MDM: CPT

## 2019-03-13 PROCEDURE — 81003 URINALYSIS AUTO W/O SCOPE: CPT

## 2019-03-13 RX ADMIN — KETOROLAC TROMETHAMINE ONE MG: 30 INJECTION, SOLUTION INTRAMUSCULAR; INTRAVENOUS at 18:46

## 2019-03-13 RX ADMIN — KETOROLAC TROMETHAMINE ONE MG: 30 INJECTION, SOLUTION INTRAMUSCULAR; INTRAVENOUS at 19:04

## 2019-03-13 NOTE — EMERGENCY ROOM REPORT
History of Present Illness


General


Chief Complaint:  Back Pain-No Injury


Source:  Patient, Medical Record





Present Illness


HPI


Patient present with complaints of right lower back pain


Essentially points to the lower aspect of the rib cage region


Patient reports that she was here last week with her sister was being seen for 

a different problem she had asked the physician at that time with the area can 

be and was told that it was likely the rib cage





Patient is been having discomfort there for several months


Today however upon getting up she felt that she had increased pain


Reports that when she is walking and doing other activities she feels okay 

however when she is laying down and tries to stand up the pain worsens


Denies any vomiting or diarrhea denies any abdominal pain


Allergies:  


Coded Allergies:  


     PENICILLINS (Verified  Allergy, Mild, Rash, 8/16/14)





Patient History


Past Medical History:  see triage record


Pertinent Family History:  none


Reviewed Nursing Documentation:  PMH: Agreed; PSxH: Agreed





Nursing Documentation-PMH


Past Medical History:  No History, Except For


Hx Hypertension:  Yes


Hx Cerebrovascular Accident:  Yes - 2014 x2


Hx Seizures:  No





Review of Systems


All Other Systems:  negative except mentioned in HPI





Physical Exam





Vital Signs








  Date Time  Temp Pulse Resp B/P (MAP) Pulse Ox O2 Delivery O2 Flow Rate FiO2


 


3/13/19 18:15 98.1 77 18 176/92 99 Room Air  








Sp02 EP Interpretation:  reviewed, normal


General Appearance:  well appearing, no apparent distress


Head:  normocephalic, atraumatic


Eyes:  bilateral eye PERRL, bilateral eye EOMI


ENT:  normal pharynx


Neck:  supple


Respiratory:  lungs clear, no respiratory distress, no retraction


Cardiovascular #1:  regular rate, rhythm, no edema


Gastrointestinal:  normal bowel sounds, non tender, soft


Genitourinary:  no CVA tenderness


Musculoskeletal:  other - Specific pinpoint tenderness over the lower posterior 

midline clavicular region


Neurologic:  alert, oriented x3, responsive


Skin:  no rash, warm/dry


Lymphatic:  no adenopathy





Medical Decision Making


Diagnostic Impression:  


 Primary Impression:  


 Flank pain


ER Course


Multiple differentials considered including but not limited to renal, cardiac, 

cardiopulmonary, vascular emergencies





Patient's urine sample showed some contaminated results, does not have any 

burning or other symptoms and therefore we will be pending culture for further 

input for that





Patient's kidney function and CBC are otherwise within normal limits


Patient is very specific and pinpoint discomfort raising the likelihood of more 

specific and site specific pathology such as costochondritis or muscle sprain





Patient however also does report having history of lupus


With this history patient with benefit from close outpatient follow-up with 

ultrasonography and further imaging is needed


Otherwise does not meet any further emergency criteria for workup and his 

disposition for close outpatient care





Labs








Test


  3/13/19


18:30 3/13/19


18:57


 


Urine Color Yellow  


 


Urine Appearance Cloudy  


 


Urine pH 6.5 (4.5-8.0)  


 


Urine Specific Gravity


  1.015


(1.005-1.035) 


 


 


Urine Protein 1+ (NEGATIVE)  


 


Urine Glucose (UA)


  Negative


(NEGATIVE) 


 


 


Urine Ketones


  Negative


(NEGATIVE) 


 


 


Urine Blood 1+ (NEGATIVE)  


 


Urine Nitrite


  Negative


(NEGATIVE) 


 


 


Urine Bilirubin


  Negative


(NEGATIVE) 


 


 


Urine Urobilinogen


  1 MG/DL


(0.0-1.0) 


 


 


Urine Leukocyte Esterase 1+ (NEGATIVE)  


 


Urine RBC


  0-2 /HPF (0 -


2) 


 


 


Urine WBC


  0-2 /HPF (0 -


2) 


 


 


Urine Squamous Epithelial


Cells Many /LPF


(NONE/OCC) 


 


 


Urine Bacteria


  Moderate /HPF


(NONE) 


 


 


White Blood Count


  


  7.2 K/UL


(4.8-10.8)


 


Red Blood Count


  


  4.98 M/UL


(4.20-5.40)


 


Hemoglobin


  


  13.2 G/DL


(12.0-16.0)


 


Hematocrit


  


  40.1 %


(37.0-47.0)


 


Mean Corpuscular Volume  80 FL (80-99) 


 


Mean Corpuscular Hemoglobin


  


  26.4 PG


(27.0-31.0)


 


Mean Corpuscular Hemoglobin


Concent 


  32.8 G/DL


(32.0-36.0)


 


Red Cell Distribution Width


  


  14.3 %


(11.6-14.8)


 


Platelet Count


  


  245 K/UL


(150-450)


 


Mean Platelet Volume


  


  5.8 FL


(6.5-10.1)


 


Neutrophils (%) (Auto)


  


  40.9 %


(45.0-75.0)


 


Lymphocytes (%) (Auto)


  


  51.4 %


(20.0-45.0)


 


Monocytes (%) (Auto)


  


  5.7 %


(1.0-10.0)


 


Eosinophils (%) (Auto)


  


  1.3 %


(0.0-3.0)


 


Basophils (%) (Auto)


  


  0.8 %


(0.0-2.0)


 


Sodium Level


  


  141 MMOL/L


(136-145)


 


Potassium Level


  


  3.6 MMOL/L


(3.5-5.1)


 


Chloride Level


  


  104 MMOL/L


()


 


Carbon Dioxide Level


  


  28 MMOL/L


(21-32)


 


Anion Gap


  


  9 mmol/L


(5-15)


 


Blood Urea Nitrogen


  


  14 mg/dL


(7-18)


 


Creatinine


  


  1.1 MG/DL


(0.55-1.30)


 


Estimat Glomerular Filtration


Rate 


  > 60 mL/min


(>60)


 


Glucose Level


  


  109 MG/DL


()


 


Calcium Level


  


  9.6 MG/DL


(8.5-10.1)


 


Total Bilirubin


  


  0.3 MG/DL


(0.2-1.0)


 


Aspartate Amino Transf


(AST/SGOT) 


  11 U/L (15-37) 


 


 


Alanine Aminotransferase


(ALT/SGPT) 


  16 U/L (12-78) 


 


 


Alkaline Phosphatase


  


  65 U/L


()


 


Total Protein


  


  7.9 G/DL


(6.4-8.2)


 


Albumin


  


  4.0 G/DL


(3.4-5.0)


 


Globulin  3.9 g/dL 


 


Albumin/Globulin Ratio  1.0 (1.0-2.7) 








Chest X-Ray Diagnostic Results


Chest X-Ray Diagnostic Results :  


   Chest X-Ray Ordered:  Yes


   # of Views/Limited/Complete:  1 View


   Indication:  Chest Pain


   EP Interpretation:  Yes


   Interpretation:  no consolidation, no effusion, no pneumothorax


   Impression:  No acute disease


   Electronically Signed by:  Miguelangel Tinoco DO





Last Vital Signs








  Date Time  Temp Pulse Resp B/P (MAP) Pulse Ox O2 Delivery O2 Flow Rate FiO2


 


3/13/19 18:15 98.1 77 18 176/92 99 Room Air  








Status:  improved


Disposition:  HOME, SELF-CARE


Condition:  Improved


Scripts


Methocarbamol* (ROBAXIN-750*) 750 Mg Tablet


750 MG PO TID, #21 TAB 0 Refills


   Prov: Miguelangel Tinoco DO         3/13/19 


Ibuprofen* (MOTRIN*) 600 Mg Tablet


600 MG ORAL Q8H PRN for For Pain, #20 TAB 0 Refills


   Prov: Miguelangel Tinoco DO         3/13/19





Additional Instructions:  


Patient is provided with the discharge instructions notified to follow up with 

primary doctor in the next 2-3 days otherwise return to the er with any 

worsening symptoms.


Please note that this report is being documented using [employer] technology.  This 

can lead to erroneous entry secondary to incorrect interpretation by the 

dictating instrument.











Miguelangel Tinoco DO Mar 13, 2019 18:33

## 2019-03-13 NOTE — NUR
ED Nurse Note:



RECIEVED REPORT TO RESUME CARE, PT IS CURRENTLY BEING DISCHARGED TO HOME, PT IS 
AWAKE, ALERT AND ORIENTED X 4, PT WAS SEEN FOR CHRONIC BACK PAIN, PT GIVEN F/U 
INFO,A FTER CARE INSTRUCTIONS AND PRSCRIPTION GIVEN ELECTRONICALLY, V/S TAKEN, 
PT IS AMBULATORY, WITH FAMILY MEMBERS WHOM ARE BEING SEEN ALSO, PT ARMBAND 
REMOVED WITHOUT COMPLICATIONS, NAD NOTED DURING DC TO HOME.

## 2019-03-13 NOTE — NUR
ED Nurse Note:

patient walked in from home with steady gait, complaining of lower back pain. 
VSS at this time, skin is dry, intact. AAO x4.

## 2019-03-14 NOTE — DIAGNOSTIC IMAGING REPORT
Indication: Chest pain

 

Technique: One view of the chest

 

Comparison: 8/22/2018

 

Findings: Heart is enlarged. The lungs and pleural spaces are clear.

 

Impression: Cardiomegaly

 

No acute process

## 2019-06-25 ENCOUNTER — HOSPITAL ENCOUNTER (EMERGENCY)
Dept: HOSPITAL 72 - EMR | Age: 54
Discharge: HOME | End: 2019-06-25
Payer: COMMERCIAL

## 2019-06-25 VITALS — SYSTOLIC BLOOD PRESSURE: 133 MMHG | DIASTOLIC BLOOD PRESSURE: 79 MMHG

## 2019-06-25 VITALS — BODY MASS INDEX: 30.24 KG/M2 | HEIGHT: 59 IN | WEIGHT: 150 LBS

## 2019-06-25 DIAGNOSIS — Z88.0: ICD-10-CM

## 2019-06-25 DIAGNOSIS — Z86.73: ICD-10-CM

## 2019-06-25 DIAGNOSIS — S91.331A: Primary | ICD-10-CM
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
## 2019-06-25 DIAGNOSIS — Z23: ICD-10-CM
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
## 2019-06-25 DIAGNOSIS — I10: ICD-10-CM

## 2019-06-25 PROCEDURE — 90471 IMMUNIZATION ADMIN: CPT

## 2019-06-25 PROCEDURE — 90715 TDAP VACCINE 7 YRS/> IM: CPT

## 2019-06-25 PROCEDURE — 99283 EMERGENCY DEPT VISIT LOW MDM: CPT

## 2019-06-25 NOTE — EMERGENCY ROOM REPORT
History of Present Illness


General


Chief Complaint:  Lower Extremity Injury


Source:  Patient





Present Illness


HPI


53-year-old female with history of hypertension currently controlled here 

complaining of 1 day of pain in the bottom of her right foot after she stepped 

on a nail through her shoe today.  Patient is rating her pain 7 out of 10 

without radiation denying tingling numbness.  Denies bleeding.  Is not up-to-

date with her tetanus shot.  Denies chest pain, shortness of breath, palpitation

, abdominal pain, nausea vomiting.  Denies all other injuries.  Patient is able 

to move her toes and feels a lot of her foot.  Patient reports that she is 

allergic to penicillin however only the penicillin family and okay to take 

cephalosporins.


Allergies:  


Coded Allergies:  


     PENICILLINS (Verified  Allergy, Mild, Rash, 8/16/14)





Patient History


Past Medical History:  see triage record


Past Surgical History:  unable to obtain


Pertinent Family History:  none


Pregnant Now:  No


Immunizations:  UTD


Reviewed Nursing Documentation:  PMH: Agreed; PSxH: Agreed





Nursing Documentation-PMH


Past Medical History:  No History, Except For


Hx Hypertension:  Yes


Hx Cerebrovascular Accident:  Yes - 2014 x2


Hx Seizures:  No





Review of Systems


All Other Systems:  negative except mentioned in HPI





Physical Exam





Vital Signs








  Date Time  Temp Pulse Resp B/P (MAP) Pulse Ox O2 Delivery O2 Flow Rate FiO2


 


6/25/19 16:07 98.2 97 20 133/79 (97) 100 Room Air  








Sp02 EP Interpretation:  reviewed, normal


General Appearance:  normal inspection, well appearing, no apparent distress, 

GCS 15


Head:  normocephalic, atraumatic


Eyes:  bilateral eye normal inspection, bilateral eye PERRL


ENT:  normal ENT inspection, hearing grossly normal, normal pharynx, no 

angioedema


Neck:  normal inspection, full range of motion, supple, thyroid normal


Respiratory:  normal inspection, chest non-tender, lungs clear, normal breath 

sounds, no rhonchi, no wheezing


Cardiovascular #1:  normal inspection, regular rate, rhythm, no edema


Cardiovascular #2:  2+ dorsalis pedis (R), 2+ dorsalis pedis (L)


Gastrointestinal:  normal inspection, soft, no mass


Rectal:  deferred


Genitourinary:  no CVA tenderness


Musculoskeletal:  digits/nails normal, gait/station normal, other - Puncture 

wound right foot


Neurologic:  normal inspection, alert, oriented x3, responsive, CNs III-XII nml 

as tested


Psychiatric:  normal inspection, judgement/insight normal, memory normal


Skin:  no rash, well hydrated, other - Puncture wound right foot


Lymphatic:  normal inspection, no adenopathy





Medical Decision Making


PA Attestation


All my diagnosis and treatment plans were reviewed ad discussed with my 

supervising physician Dr. Rodriguez


Diagnostic Impression:  


 Primary Impression:  


 Puncture wound of right foot


ER Course


53-year-old female with history of hypertension currently controlled here 

complaining of 1 day of pain in the bottom of her right foot after she stepped 

on a nail through her shoe today.  Patient is rating her pain 7 out of 10 

without radiation denying tingling numbness.  Denies bleeding.  Is not up-to-

date with her tetanus shot.  Denies chest pain, shortness of breath, palpitation

, abdominal pain, nausea vomiting.  Denies all other injuries.  Patient is able 

to move her toes and feels a lot of her foot.  Patient reports that she is 

allergic to penicillin however only the penicillin family and okay to take 

cephalosporins.





Ddx considered but are not limited to : Superficial laceration, abrasion, deep 

puncture wound, superficial puncture wound without motor or sensory deficit


Vital signs: are WNL, pt. is afebrile





H&PE are most consistent with: Superficial puncture wound without motor or 

sensory deficit





ORDERS: Tdap, x-ray right foot, Keflex, naproxen


ED INTERVENTIONS: Tdap, wound clean and dress





DISCHARGE: At this time pt. is stable for d/c to home. Will provide printed 

patient care instructions, and any necessary prescriptions. Care plan and 

follow up instructions have been discussed with the patient prior to discharge.

  Patient to follow-up with a primary care provider keep elevated alternate 

between icing and heating


Other X-Ray Diagnostic Results


Other X-Ray Diagnostic Results :  


   X-Ray ordered:  right foot


   # of Views/Limited Vs Complete:  2 View


   Indication:  Pain


   EP Interpretation:  Yes


   PA Xray:  Interpretation reviewed, by supervising MD, and agrees with 

findings.


   Interpretation:  no dislocation, no soft tissue swelling, no fractures, 

other - no FB


   Impression:  No acute disease


   Electronically Signed by:  christie son PA-C





Last Vital Signs








  Date Time  Temp Pulse Resp B/P (MAP) Pulse Ox O2 Delivery O2 Flow Rate FiO2


 


6/25/19 16:07 98.2 97 20 133/79 (97) 100 Room Air  








Disposition:  HOME, SELF-CARE


Condition:  Stable


Scripts


Naproxen* (NAPROXEN*) 500 Mg Tablet


500 MG ORAL TWICE A DAY, #20 TAB


   Prov: Christie Higginbotham         6/25/19 


Cephalexin* (KEFLEX*) 500 Mg Capsule


500 MG ORAL EVERY 6 HOURS for 7 Days, #28 CAP


   Prov: Christie Higginbotham         6/25/19


Patient Instructions:  Puncture Wound, Easy-to-Read





Additional Instructions:  


Take medication as directed follow-up with your primary care provider











Christie Higginbotham Jun 25, 2019 16:42

## 2019-06-25 NOTE — DIAGNOSTIC IMAGING REPORT
Indication: Foot Pain

 

Comparison: None

 

Findings: 3  views of the right foot were obtained. 

 

No acute fractures, malalignment, erosions or periostitis are identified. Soft

tissues are unremarkable.

 

 

Impression: No acute findings.

## 2019-06-25 NOTE — NUR
ER DISCHARGE NOTE:

Patient is cleared to be discharged per DERRICK GIRON, pt is aox4, on room air, 
with stable vital signs. pt was given dc and prescription instructions, pt was 
able to verbalize understanding, pt id band removed without complications. pt 
is able to ambulate with steady gait. pt took all belongings.

## 2019-06-25 NOTE — NUR
ED Nurse Note:

Patient walked into ED c/o patient stepped on a nail with her right foot  today 
around 0620, 

patient is alert awake x4 ambulatory, breathing unlabored and even